# Patient Record
Sex: FEMALE | Race: BLACK OR AFRICAN AMERICAN | Employment: UNEMPLOYED | URBAN - METROPOLITAN AREA
[De-identification: names, ages, dates, MRNs, and addresses within clinical notes are randomized per-mention and may not be internally consistent; named-entity substitution may affect disease eponyms.]

---

## 2017-11-16 ENCOUNTER — HOSPITAL ENCOUNTER (EMERGENCY)
Facility: CLINIC | Age: 47
Discharge: HOME | End: 2017-11-16
Attending: EMERGENCY MEDICINE
Payer: COMMERCIAL

## 2017-11-16 VITALS
OXYGEN SATURATION: 98 % | BODY MASS INDEX: 24.33 KG/M2 | WEIGHT: 155 LBS | RESPIRATION RATE: 16 BRPM | HEIGHT: 67 IN | HEART RATE: 83 BPM | TEMPERATURE: 97.9 F | DIASTOLIC BLOOD PRESSURE: 86 MMHG | SYSTOLIC BLOOD PRESSURE: 145 MMHG

## 2017-11-16 DIAGNOSIS — R05.9 COUGH: Primary | ICD-10-CM

## 2017-11-16 PROCEDURE — 6360000002 HC RX W HCPCS: Performed by: EMERGENCY MEDICINE

## 2017-11-16 PROCEDURE — 99283 EMERGENCY DEPT VISIT LOW MDM: CPT

## 2017-11-16 RX ORDER — ALBUTEROL SULFATE 2.5 MG/3ML
2.5 SOLUTION RESPIRATORY (INHALATION) ONCE
Status: COMPLETED | OUTPATIENT
Start: 2017-11-16 | End: 2017-11-16

## 2017-11-16 RX ORDER — ALBUTEROL SULFATE 90 UG/1
2 AEROSOL, METERED RESPIRATORY (INHALATION) EVERY 6 HOURS PRN
Status: DISCONTINUED | OUTPATIENT
Start: 2017-11-16 | End: 2017-11-17 | Stop reason: HOSPADM

## 2017-11-16 RX ORDER — ALBUTEROL SULFATE 90 UG/1
1-2 AEROSOL, METERED RESPIRATORY (INHALATION) EVERY 4 HOURS PRN
Qty: 1 INHALER | Refills: 0 | Status: SHIPPED | OUTPATIENT
Start: 2017-11-16

## 2017-11-16 RX ADMIN — ALBUTEROL SULFATE 2.5 MG: 2.5 SOLUTION RESPIRATORY (INHALATION) at 22:05

## 2017-11-17 NOTE — ED PROVIDER NOTES
saturation is 98%. Gen.: Patient is alert. No apparent distress. HEENT: Head is atraumatic. Mouth shows moist mucous membranes. Respiratory: Lung sounds are clear bilateral without wheezes or rhonchi though she has a slightly increased expiratory phase. Cardiac: Heart is regular rate and rhythm    DIFFERENTIAL DIAGNOSIS/ MDM:     URI, bronchospasms    DIAGNOSTIC RESULTS         EMERGENCY DEPARTMENT COURSE:   Vitals:    Vitals:    11/16/17 2144   Pulse: 83   Temp: 97.9 °F (36.6 °C)   TempSrc: Oral   SpO2: 98%   Weight: 70.3 kg (155 lb)   Height: 5' 7\" (1.702 m)     -------------------------   , Temp: 97.9 °F (36.6 °C), Pulse: 83,      Orders Placed This Encounter   Medications    albuterol (PROVENTIL) nebulizer solution 2.5 mg    albuterol sulfate HFA (PROVENTIL HFA) 108 (90 Base) MCG/ACT inhaler     Sig: Inhale 1-2 puffs into the lungs every 4 hours as needed for Wheezing     Dispense:  1 Inhaler     Refill:  0    albuterol sulfate  (90 Base) MCG/ACT inhaler 2 puff           Re-evaluation Notes    Patient is given albuterol here. She states she felt much better, cough, episodes from much less reevaluation showed trace amount of end expiratory wheezes on the left base. At this time. She will be sent home with an inhaler and spacer. Follow-up as needed and return if worse. No indications of pneumonia or acute bacterial infections        FINAL IMPRESSION      1.  Cough          DISPOSITION/PLAN   DISPOSITION Decision to Discharge    Condition on Disposition    Stable and improved    PATIENT REFERRED TO:  [address] [place] [address] [address]  [address] [facility]  [address]  [address]  [phone]      As needed      DISCHARGE MEDICATIONS:  New Prescriptions    ALBUTEROL SULFATE HFA (PROVENTIL HFA) 108 (90 BASE) MCG/ACT INHALER    Inhale 1-2 puffs into the lungs every 4 hours as needed for Wheezing       (Please note that portions of this note were completed with a voice recognition program.  Efforts were made to edit the dictations but occasionally words are mis-transcribed.)    Hansen MD, F.A.C.E.P.   Attending Emergency Physician        George Balderas MD  11/16/17 2997

## 2022-11-04 NOTE — ED TRIAGE NOTES
Pt reports she has chest congestion for the last two weeks. Pt states that she has been using mucinex with a non-productive cough. Isotretinoin Pregnancy And Lactation Text: This medication is Pregnancy Category X and is considered extremely dangerous during pregnancy. It is unknown if it is excreted in breast milk.